# Patient Record
Sex: FEMALE | Race: WHITE | NOT HISPANIC OR LATINO | URBAN - METROPOLITAN AREA
[De-identification: names, ages, dates, MRNs, and addresses within clinical notes are randomized per-mention and may not be internally consistent; named-entity substitution may affect disease eponyms.]

---

## 2017-09-18 ENCOUNTER — APPOINTMENT (OUTPATIENT)
Dept: WOMENS IMAGING | Facility: HOSPITAL | Age: 77
End: 2017-09-18

## 2017-09-18 PROCEDURE — G0202 SCR MAMMO BI INCL CAD: HCPCS | Performed by: RADIOLOGY

## 2018-09-25 ENCOUNTER — APPOINTMENT (OUTPATIENT)
Dept: WOMENS IMAGING | Facility: HOSPITAL | Age: 78
End: 2018-09-25

## 2018-09-25 PROCEDURE — 77067 SCR MAMMO BI INCL CAD: CPT | Performed by: RADIOLOGY

## 2019-10-08 ENCOUNTER — APPOINTMENT (OUTPATIENT)
Dept: WOMENS IMAGING | Facility: HOSPITAL | Age: 79
End: 2019-10-08

## 2019-10-08 PROCEDURE — 77067 SCR MAMMO BI INCL CAD: CPT | Performed by: RADIOLOGY

## 2020-11-24 ENCOUNTER — APPOINTMENT (OUTPATIENT)
Dept: WOMENS IMAGING | Facility: HOSPITAL | Age: 80
End: 2020-11-24

## 2020-11-24 PROCEDURE — 77067 SCR MAMMO BI INCL CAD: CPT | Performed by: RADIOLOGY

## 2025-06-03 ENCOUNTER — APPOINTMENT (OUTPATIENT)
Facility: HOSPITAL | Age: 85
End: 2025-06-03
Payer: MEDICARE

## 2025-06-03 ENCOUNTER — HOSPITAL ENCOUNTER (EMERGENCY)
Facility: HOSPITAL | Age: 85
Discharge: HOME | End: 2025-06-03
Attending: STUDENT IN AN ORGANIZED HEALTH CARE EDUCATION/TRAINING PROGRAM | Admitting: STUDENT IN AN ORGANIZED HEALTH CARE EDUCATION/TRAINING PROGRAM
Payer: MEDICARE

## 2025-06-03 VITALS
HEART RATE: 66 BPM | SYSTOLIC BLOOD PRESSURE: 168 MMHG | DIASTOLIC BLOOD PRESSURE: 67 MMHG | HEIGHT: 63 IN | WEIGHT: 130 LBS | RESPIRATION RATE: 16 BRPM | BODY MASS INDEX: 23.04 KG/M2 | OXYGEN SATURATION: 97 % | TEMPERATURE: 98.2 F

## 2025-06-03 DIAGNOSIS — M79.89 LEFT LEG SWELLING: ICD-10-CM
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
## 2025-06-03 DIAGNOSIS — S80.211A ABRASION OF RIGHT KNEE, INITIAL ENCOUNTER: ICD-10-CM

## 2025-06-03 DIAGNOSIS — S60.222A CONTUSION OF DORSUM OF LEFT HAND: Primary | ICD-10-CM

## 2025-06-03 LAB
ALBUMIN SERPL-MCNC: 3.7 G/DL (ref 3.5–5.2)
ALBUMIN/GLOB SERPL: 1.4 G/DL
ALP SERPL-CCNC: 90 U/L (ref 39–117)
ALT SERPL W P-5'-P-CCNC: 7 U/L (ref 1–33)
ANION GAP SERPL CALCULATED.3IONS-SCNC: 15.1 MMOL/L (ref 5–15)
AST SERPL-CCNC: 18 U/L (ref 1–32)
BASOPHILS # BLD AUTO: 0.05 10*3/MM3 (ref 0–0.2)
BASOPHILS NFR BLD AUTO: 0.6 % (ref 0–1.5)
BILIRUB SERPL-MCNC: 0.3 MG/DL (ref 0–1.2)
BUN SERPL-MCNC: 15.2 MG/DL (ref 8–23)
BUN/CREAT SERPL: 20.5 (ref 7–25)
CALCIUM SPEC-SCNC: 9.1 MG/DL (ref 8.6–10.5)
CHLORIDE SERPL-SCNC: 92 MMOL/L (ref 98–107)
CO2 SERPL-SCNC: 20.9 MMOL/L (ref 22–29)
CREAT SERPL-MCNC: 0.74 MG/DL (ref 0.57–1)
DEPRECATED RDW RBC AUTO: 42.6 FL (ref 37–54)
EGFRCR SERPLBLD CKD-EPI 2021: 80.4 ML/MIN/1.73
EOSINOPHIL # BLD AUTO: 0.06 10*3/MM3 (ref 0–0.4)
EOSINOPHIL NFR BLD AUTO: 0.8 % (ref 0.3–6.2)
ERYTHROCYTE [DISTWIDTH] IN BLOOD BY AUTOMATED COUNT: 14 % (ref 12.3–15.4)
GLOBULIN UR ELPH-MCNC: 2.7 GM/DL
GLUCOSE SERPL-MCNC: 111 MG/DL (ref 65–99)
HCT VFR BLD AUTO: 33.6 % (ref 34–46.6)
HGB BLD-MCNC: 11.4 G/DL (ref 12–15.9)
HOLD SPECIMEN: NORMAL
IMM GRANULOCYTES # BLD AUTO: 0.01 10*3/MM3 (ref 0–0.05)
IMM GRANULOCYTES NFR BLD AUTO: 0.1 % (ref 0–0.5)
LYMPHOCYTES # BLD AUTO: 1.1 10*3/MM3 (ref 0.7–3.1)
LYMPHOCYTES NFR BLD AUTO: 14.1 % (ref 19.6–45.3)
MCH RBC QN AUTO: 27.7 PG (ref 26.6–33)
MCHC RBC AUTO-ENTMCNC: 33.9 G/DL (ref 31.5–35.7)
MCV RBC AUTO: 81.6 FL (ref 79–97)
MONOCYTES # BLD AUTO: 0.86 10*3/MM3 (ref 0.1–0.9)
MONOCYTES NFR BLD AUTO: 11 % (ref 5–12)
NEUTROPHILS NFR BLD AUTO: 5.73 10*3/MM3 (ref 1.7–7)
NEUTROPHILS NFR BLD AUTO: 73.4 % (ref 42.7–76)
NT-PROBNP SERPL-MCNC: 447 PG/ML (ref 0–1800)
PLATELET # BLD AUTO: 174 10*3/MM3 (ref 140–450)
PMV BLD AUTO: 10.4 FL (ref 6–12)
POTASSIUM SERPL-SCNC: 4.2 MMOL/L (ref 3.5–5.2)
PROT SERPL-MCNC: 6.4 G/DL (ref 6–8.5)
RBC # BLD AUTO: 4.12 10*6/MM3 (ref 3.77–5.28)
SODIUM SERPL-SCNC: 128 MMOL/L (ref 136–145)
WBC NRBC COR # BLD AUTO: 7.81 10*3/MM3 (ref 3.4–10.8)
WHOLE BLOOD HOLD COAG: NORMAL
WHOLE BLOOD HOLD SPECIMEN: NORMAL

## 2025-06-03 PROCEDURE — 73130 X-RAY EXAM OF HAND: CPT

## 2025-06-03 PROCEDURE — 73560 X-RAY EXAM OF KNEE 1 OR 2: CPT

## 2025-06-03 PROCEDURE — 25810000003 SODIUM CHLORIDE 0.9 % SOLUTION: Performed by: PHYSICIAN ASSISTANT

## 2025-06-03 PROCEDURE — 85025 COMPLETE CBC W/AUTO DIFF WBC: CPT | Performed by: PHYSICIAN ASSISTANT

## 2025-06-03 PROCEDURE — 83880 ASSAY OF NATRIURETIC PEPTIDE: CPT | Performed by: PHYSICIAN ASSISTANT

## 2025-06-03 PROCEDURE — 80053 COMPREHEN METABOLIC PANEL: CPT | Performed by: PHYSICIAN ASSISTANT

## 2025-06-03 PROCEDURE — 99283 EMERGENCY DEPT VISIT LOW MDM: CPT | Performed by: STUDENT IN AN ORGANIZED HEALTH CARE EDUCATION/TRAINING PROGRAM

## 2025-06-03 RX ORDER — BACITRACIN ZINC, NEOMYCIN SULFATE AND POLYMYXIN B SULFATE 400; 5; 5000 [IU]/G; MG/G; [IU]/G
1 OINTMENT TOPICAL ONCE
Status: COMPLETED | OUTPATIENT
Start: 2025-06-03 | End: 2025-06-03

## 2025-06-03 RX ORDER — ACETAMINOPHEN 500 MG
500 TABLET ORAL ONCE
Status: COMPLETED | OUTPATIENT
Start: 2025-06-03 | End: 2025-06-03

## 2025-06-03 RX ADMIN — BACITRACIN ZINC, NEOMYCIN, POLYMYXIN B 1 APPLICATION: 400; 3.5; 5 OINTMENT TOPICAL at 20:05

## 2025-06-03 RX ADMIN — SODIUM CHLORIDE 500 ML: 9 INJECTION, SOLUTION INTRAVENOUS at 19:11

## 2025-06-03 RX ADMIN — ACETAMINOPHEN 500 MG: 500 TABLET, FILM COATED ORAL at 17:50

## 2025-06-03 NOTE — DISCHARGE INSTRUCTIONS
Given orders for an outpatient Doppler ultrasound tomorrow.  You will be called for an appointment time.  Return to the emergency department for any worsening symptoms.

## 2025-06-03 NOTE — FSED PROVIDER NOTE
"Subjective  History of Present Illness:    This is an 84 year old female who presents with complaints of left hand and left knee pain after a fall.  Patient states that she was getting her mail, slipped on loose gravel and fell against a guardrail.  She did not hit her head or lose consciousness but did lost her lip.  She is not anticoagulated.  She has also noticed worsening left leg swelling for the past 2 weeks.  She states that typically when she has swelling she is able to elevate and rest and the swelling improves however it has not over the past couple of weeks.  She is under the care of Dr. Stapleton for sinus tachycardia.  She recently had a cardiac workup with ultrasound, echo and stress testing.  She did have Doppler ultrasound of left leg in April due to swelling.       Nurses Notes reviewed and agree, including vitals, allergies, social history and prior medical history.     REVIEW OF SYSTEMS: All systems reviewed and not pertinent unless noted.  Review of Systems    No past medical history on file.    Allergies:    Patient has no known allergies.      No past surgical history on file.      Social History     Socioeconomic History    Marital status: Unknown         No family history on file.    Objective  Physical Exam:  /67   Pulse 66   Temp 98.2 °F (36.8 °C) (Oral)   Resp 16   Ht 160 cm (63\")   Wt 59 kg (130 lb)   SpO2 97%   BMI 23.03 kg/m²      Physical Exam  Vitals and nursing note reviewed.   HENT:      Mouth/Throat:      Mouth: Mucous membranes are moist.   Eyes:      Pupils: Pupils are equal, round, and reactive to light.   Cardiovascular:      Rate and Rhythm: Normal rate and regular rhythm.      Pulses: Normal pulses.      Heart sounds: Normal heart sounds.   Pulmonary:      Effort: Pulmonary effort is normal.      Breath sounds: Normal breath sounds.   Skin:     General: Skin is warm.      Comments: Abrasions noted to the left knee. Small amount of oozing.    Neurological:      " General: No focal deficit present.         Procedures    ED Course:         Lab Results (last 24 hours)       Procedure Component Value Units Date/Time    CBC & Differential [[id number]]  (Abnormal) Collected: 06/03/25 1739    Specimen: Blood from Arm, Right Updated: 06/03/25 1750    Narrative:      The following orders were created for panel order CBC & Differential.  Procedure                               Abnormality         Status                     ---------                               -----------         ------                     CBC Auto Differential[[id number]]        Abnormal            Final result                 Please view results for these tests on the individual orders.    Comprehensive Metabolic Panel [[id number]]  (Abnormal) Collected: 06/03/25 1739    Specimen: Blood from Arm, Right Updated: 06/03/25 1807     Glucose 111 mg/dL      BUN 15.2 mg/dL      Creatinine 0.74 mg/dL      Sodium 128 mmol/L      Potassium 4.2 mmol/L      Chloride 92 mmol/L      CO2 20.9 mmol/L      Calcium 9.1 mg/dL      Total Protein 6.4 g/dL      Albumin 3.7 g/dL      ALT (SGPT) 7 U/L      AST (SGOT) 18 U/L      Alkaline Phosphatase 90 U/L      Total Bilirubin 0.3 mg/dL      Globulin 2.7 gm/dL      A/G Ratio 1.4 g/dL      BUN/Creatinine Ratio 20.5     Anion Gap 15.1 mmol/L      eGFR 80.4 mL/min/1.73     Narrative:      GFR Categories in Chronic Kidney Disease (CKD)              GFR Category          GFR (mL/min/1.73)    Interpretation  G1                    90 or greater        Normal or high (1)  G2                    60-89                Mild decrease (1)  G3a                   45-59                Mild to moderate decrease  G3b                   30-44                Moderate to severe decrease  G4                    15-29                Severe decrease  G5                    14 or less           Kidney failure    (1)In the absence of evidence of kidney disease, neither GFR category G1 or G2 fulfill the criteria for  CKD.    eGFR calculation 2021 CKD-EPI creatinine equation, which does not include race as a factor    BNP [[id number]]  (Normal) Collected: 06/03/25 1739    Specimen: Blood from Arm, Right Updated: 06/03/25 1804     proBNP 447.0 pg/mL     Narrative:      This assay is used as an aid in the diagnosis of individuals suspected of having heart failure. It can be used as an aid in the diagnosis of acute decompensated heart failure (ADHF) in patients presenting with signs and symptoms of ADHF to the emergency department (ED). In addition, NT-proBNP of <300 pg/mL indicates ADHF is not likely.    Age Range Result Interpretation  NT-proBNP Concentration (pg/mL:      <50             Positive            >450                   Gray                 300-450                    Negative             <300    50-75           Positive            >900                  Gray                300-900                  Negative            <300      >75             Positive            >1800                  Gray                300-1800                  Negative            <300    CBC Auto Differential [[id number]]  (Abnormal) Collected: 06/03/25 1739    Specimen: Blood from Arm, Right Updated: 06/03/25 1750     WBC 7.81 10*3/mm3      RBC 4.12 10*6/mm3      Hemoglobin 11.4 g/dL      Hematocrit 33.6 %      MCV 81.6 fL      MCH 27.7 pg      MCHC 33.9 g/dL      RDW 14.0 %      RDW-SD 42.6 fl      MPV 10.4 fL      Platelets 174 10*3/mm3      Neutrophil % 73.4 %      Lymphocyte % 14.1 %      Monocyte % 11.0 %      Eosinophil % 0.8 %      Basophil % 0.6 %      Immature Grans % 0.1 %      Neutrophils, Absolute 5.73 10*3/mm3      Lymphocytes, Absolute 1.10 10*3/mm3      Monocytes, Absolute 0.86 10*3/mm3      Eosinophils, Absolute 0.06 10*3/mm3      Basophils, Absolute 0.05 10*3/mm3      Immature Grans, Absolute 0.01 10*3/mm3              XR Hand 3+ View Left  Result Date: 6/3/2025  XR KNEE 1 OR 2 VW LEFT, XR HAND 3+ VW LEFT Date of Exam: 6/3/2025 5:36 PM  EDT Indication: fall right knee pain Comparison: None available. Findings: There is prepatellar soft tissue swelling and fat stranding. No soft tissue air or radiopaque foreign body. No knee joint effusion. No acute fracture or traumatic malalignment.     Impression: Impression: Anterior knee/prepatellar soft tissue contusion. No acute fracture or traumatic malalignment. Electronically Signed: John Gonzales MD  6/3/2025 6:18 PM EDT  Workstation ID: [id number]    XR Knee 1 or 2 View Left  Result Date: 6/3/2025  XR KNEE 1 OR 2 VW LEFT, XR HAND 3+ VW LEFT Date of Exam: 6/3/2025 5:36 PM EDT Indication: fall right knee pain Comparison: None available. Findings: There is prepatellar soft tissue swelling and fat stranding. No soft tissue air or radiopaque foreign body. No knee joint effusion. No acute fracture or traumatic malalignment.     Impression: Impression: Anterior knee/prepatellar soft tissue contusion. No acute fracture or traumatic malalignment. Electronically Signed: John Gonzales MD  6/3/2025 6:18 PM EDT  Workstation ID: [id number]         [facility]     Amount and/or Complexity of Data Reviewed  Clinical lab tests: reviewed  Tests in the radiology section of CPT®: reviewed        Initial impression of presenting illness: This is a 89-year-old female who presents with complaints of left knee and left hand pain after a fall.  Patient also has noted left leg swelling.  X-rays did not reveal any acute evidence of fractures.  I am unable to get a Doppler ultrasound of her left leg today however I did place an order for tomorrow.  I have low suspicion for a DVT as he does have a history of peripheral edema.  Her neighbor who is one of her caretakers did contact Dr. Stapleton's office while patient was here and he recommended compression stockings which I encouraged patient to use.  She states that she has some at home.  She was mildly hyponatremic at 128.  Patient is unsure if she has been hyponatremic in the past.  She has  no dizziness, no confusion.  I did give her a 500 mL bolus of normal saline and recommended she follow-up with her primary care provider in 1 week for recheck of her BMP.  Discussed all findings and plan of care with the patient who is agreeable to discharge.    DDX: includes but is not limited to: Abrasion, fracture, contusion, DVT        Medications   acetaminophen (TYLENOL) tablet 500 mg (500 mg Oral Given 6/3/25 1750)   sodium chloride 0.9 % bolus 500 mL (0 mL Intravenous Stopped 6/3/25 2005)   Triple Antibiotic 5-400-5000 MG-UNIT ointment 1 Application (1 Application Topical Given 6/3/25 2005)         Data interpreted: Nursing notes reviewed, vital signs reviewed.  Labs independently interpreted by me (CBC, CMP, lipase, UA, troponin, ABG, lactic acid, procalcitonin).  Imaging independently interpreted by me (x-ray, CT scan).  EKG independently interpreted by me.  O2 saturation:    Counseling: Discussed the results above with the patient regarding need for admission or discharge.  Patient understands and agrees plan of care.      -----  ED Disposition       ED Disposition   Discharge    Condition   Stable    Comment   --             Final diagnoses:   Contusion of dorsum of left hand   Abrasion of right knee, initial encounter   Fall from standing, initial encounter   Left leg swelling      Your Follow-Up Providers       Go to  [facility] EMERGENCY DEPARTMENT [place].    Specialty: Emergency Medicine  Follow up details: As needed, If symptoms worsen  [address]  [facility] [address] [phone]             Maikol Brambila MD.    Specialty: Family Medicine  Follow up details: As needed, If symptoms worsen  [address]  [facility] [address]  [phone]                       Contact information for after-discharge care    Follow-up information has not been specified.                    Your medication list      as of Nadia 3, 2025  7:56 PM     You  have not been prescribed any medications.

## 2025-06-07 ENCOUNTER — HOSPITAL ENCOUNTER (EMERGENCY)
Facility: HOSPITAL | Age: 85
Discharge: HOME | End: 2025-06-07
Attending: EMERGENCY MEDICINE
Payer: MEDICARE

## 2025-06-07 ENCOUNTER — APPOINTMENT (OUTPATIENT)
Facility: HOSPITAL | Age: 85
End: 2025-06-07
Payer: MEDICARE

## 2025-06-07 VITALS
OXYGEN SATURATION: 99 % | HEART RATE: 59 BPM | SYSTOLIC BLOOD PRESSURE: 167 MMHG | RESPIRATION RATE: 20 BRPM | HEIGHT: 61 IN | BODY MASS INDEX: 25.69 KG/M2 | TEMPERATURE: 98.5 F | WEIGHT: 136.1 LBS | DIASTOLIC BLOOD PRESSURE: 61 MMHG

## 2025-06-07 DIAGNOSIS — M25.562 ACUTE PAIN OF LEFT KNEE: Primary | ICD-10-CM

## 2025-06-07 DIAGNOSIS — R60.0 EDEMA OF LEFT LOWER EXTREMITY: ICD-10-CM

## 2025-06-07 DIAGNOSIS — S80.212A ABRASION OF LEFT KNEE, INITIAL ENCOUNTER: ICD-10-CM

## 2025-06-07 LAB
ANION GAP SERPL CALCULATED.3IONS-SCNC: 9.8 MMOL/L (ref 5–15)
BASOPHILS # BLD AUTO: 0.04 10*3/MM3 (ref 0–0.2)
BASOPHILS NFR BLD AUTO: 0.8 % (ref 0–1.5)
BUN SERPL-MCNC: 15.1 MG/DL (ref 8–23)
BUN/CREAT SERPL: 20.4 (ref 7–25)
CALCIUM SPEC-SCNC: 9.2 MG/DL (ref 8.6–10.5)
CHLORIDE SERPL-SCNC: 94 MMOL/L (ref 98–107)
CO2 SERPL-SCNC: 23.2 MMOL/L (ref 22–29)
CREAT SERPL-MCNC: 0.74 MG/DL (ref 0.57–1)
DEPRECATED RDW RBC AUTO: 43.4 FL (ref 37–54)
EGFRCR SERPLBLD CKD-EPI 2021: 80.4 ML/MIN/1.73
EOSINOPHIL # BLD AUTO: 0.09 10*3/MM3 (ref 0–0.4)
EOSINOPHIL NFR BLD AUTO: 1.8 % (ref 0.3–6.2)
ERYTHROCYTE [DISTWIDTH] IN BLOOD BY AUTOMATED COUNT: 14 % (ref 12.3–15.4)
GLUCOSE SERPL-MCNC: 107 MG/DL (ref 65–99)
HCT VFR BLD AUTO: 33.6 % (ref 34–46.6)
HGB BLD-MCNC: 11.2 G/DL (ref 12–15.9)
IMM GRANULOCYTES # BLD AUTO: 0.01 10*3/MM3 (ref 0–0.05)
IMM GRANULOCYTES NFR BLD AUTO: 0.2 % (ref 0–0.5)
INR PPP: 0.92 (ref 0.89–1.12)
LYMPHOCYTES # BLD AUTO: 1.49 10*3/MM3 (ref 0.7–3.1)
LYMPHOCYTES NFR BLD AUTO: 29.4 % (ref 19.6–45.3)
MCH RBC QN AUTO: 28 PG (ref 26.6–33)
MCHC RBC AUTO-ENTMCNC: 33.3 G/DL (ref 31.5–35.7)
MCV RBC AUTO: 84 FL (ref 79–97)
MONOCYTES # BLD AUTO: 0.53 10*3/MM3 (ref 0.1–0.9)
MONOCYTES NFR BLD AUTO: 10.5 % (ref 5–12)
NEUTROPHILS NFR BLD AUTO: 2.9 10*3/MM3 (ref 1.7–7)
NEUTROPHILS NFR BLD AUTO: 57.3 % (ref 42.7–76)
NT-PROBNP SERPL-MCNC: 457 PG/ML (ref 0–1800)
PLATELET # BLD AUTO: 178 10*3/MM3 (ref 140–450)
PMV BLD AUTO: 10 FL (ref 6–12)
POTASSIUM SERPL-SCNC: 4.2 MMOL/L (ref 3.5–5.2)
PROTHROMBIN TIME: 12.5 SECONDS (ref 12.2–14.5)
RBC # BLD AUTO: 4 10*6/MM3 (ref 3.77–5.28)
SODIUM SERPL-SCNC: 127 MMOL/L (ref 136–145)
WBC NRBC COR # BLD AUTO: 5.06 10*3/MM3 (ref 3.4–10.8)

## 2025-06-07 PROCEDURE — 85025 COMPLETE CBC W/AUTO DIFF WBC: CPT | Performed by: PHYSICIAN ASSISTANT

## 2025-06-07 PROCEDURE — 90715 TDAP VACCINE 7 YRS/> IM: CPT | Performed by: PHYSICIAN ASSISTANT

## 2025-06-07 PROCEDURE — 90471 IMMUNIZATION ADMIN: CPT | Performed by: PHYSICIAN ASSISTANT

## 2025-06-07 PROCEDURE — 83880 ASSAY OF NATRIURETIC PEPTIDE: CPT | Performed by: PHYSICIAN ASSISTANT

## 2025-06-07 PROCEDURE — 36415 COLL VENOUS BLD VENIPUNCTURE: CPT

## 2025-06-07 PROCEDURE — 25010000002 TETANUS-DIPHTH-ACELL PERTUSSIS 5-2.5-18.5 LF-MCG/0.5 SUSPENSION PREFILLED SYRINGE: Performed by: PHYSICIAN ASSISTANT

## 2025-06-07 PROCEDURE — 85610 PROTHROMBIN TIME: CPT | Performed by: PHYSICIAN ASSISTANT

## 2025-06-07 PROCEDURE — 80048 BASIC METABOLIC PNL TOTAL CA: CPT | Performed by: PHYSICIAN ASSISTANT

## 2025-06-07 PROCEDURE — 73590 X-RAY EXAM OF LOWER LEG: CPT

## 2025-06-07 PROCEDURE — 99283 EMERGENCY DEPT VISIT LOW MDM: CPT | Performed by: EMERGENCY MEDICINE

## 2025-06-07 PROCEDURE — 73560 X-RAY EXAM OF KNEE 1 OR 2: CPT

## 2025-06-07 RX ORDER — ASPIRIN 81 MG/1
81 TABLET, CHEWABLE ORAL DAILY
COMMUNITY

## 2025-06-07 RX ORDER — METOPROLOL SUCCINATE 25 MG/1
25 TABLET, EXTENDED RELEASE ORAL DAILY
COMMUNITY

## 2025-06-07 RX ORDER — OMEPRAZOLE 20 MG/1
20 CAPSULE, DELAYED RELEASE ORAL DAILY
COMMUNITY

## 2025-06-07 RX ORDER — HYDROCHLOROTHIAZIDE 25 MG/1
25 TABLET ORAL DAILY
COMMUNITY

## 2025-06-07 RX ADMIN — TETANUS TOXOID, REDUCED DIPHTHERIA TOXOID AND ACELLULAR PERTUSSIS VACCINE, ADSORBED 0.5 ML: 5; 2.5; 8; 8; 2.5 SUSPENSION INTRAMUSCULAR at 17:35

## 2025-06-07 RX ADMIN — SACUBITRIL AND VALSARTAN 1 TABLET: 49; 51 TABLET, FILM COATED ORAL at 17:36

## 2025-06-07 NOTE — DISCHARGE INSTRUCTIONS
Follow-up with PCP for recheck of symptoms.  You should be contacted to schedule appointment for left lower extremity ultrasound tomorrow morning.  If you do not get contact to set up an appointment please call [facility] to schedule appointment. [phone].  Have low threshold to return the emergency department for new, worsening, concerning symptoms

## 2025-06-07 NOTE — FSED PROVIDER NOTE
"Subjective  History of Present Illness:    Patient is an 84-year-old female presents emergency department for evaluation of left knee pain and left lower extremity edema.  Patient was evaluated in the emergency department on Nadia 3 after she had tripped and fallen landing on that left knee causing abrasion and left knee pain.  Patient had noted left lower extremity edema at that time but was unable to get ultrasound of the emergency department due to lack of access for vascular ultrasound and is scheduled to have 1 on the 12th of this month.  Patient has continued edema and pain prompting her return to emergency department for evaluation.  Patient denies new trauma.  Patient denies lightheadedness, chest pain, dyspnea, fever or systemic symptoms.  Patient is able to ambulate without assistance.      Nurses Notes reviewed and agree, including vitals, allergies, social history and prior medical history.     REVIEW OF SYSTEMS: All systems reviewed and not pertinent unless noted.  Review of Systems   Musculoskeletal:         Left lower extremity edema and abrasion of anterior aspect left knee   All other systems reviewed and are negative.      No past medical history on file.    Allergies:    Patient has no known allergies.      No past surgical history on file.      Social History     Socioeconomic History    Marital status: Single         No family history on file.    Objective  Physical Exam:  /61   Pulse 59   Temp 98.5 °F (36.9 °C) (Oral)   Resp 20   Ht 154.9 cm (61\")   Wt 61.7 kg (136 lb 1.6 oz)   SpO2 99%   BMI 25.72 kg/m²      Physical Exam  Vitals and nursing note reviewed.   Constitutional:       General: She is not in acute distress.     Appearance: Normal appearance. She is not toxic-appearing.   HENT:      Head: Normocephalic and atraumatic.      Nose: Nose normal.      Mouth/Throat:      Mouth: Mucous membranes are moist.   Eyes:      Extraocular Movements: Extraocular movements intact.      " Conjunctiva/sclera: Conjunctivae normal.      Pupils: Pupils are equal, round, and reactive to light.   Cardiovascular:      Rate and Rhythm: Normal rate and regular rhythm.      Pulses: Normal pulses.      Heart sounds: Normal heart sounds. No murmur heard.  Pulmonary:      Effort: Pulmonary effort is normal. No respiratory distress.      Breath sounds: Normal breath sounds. No stridor. No wheezing.   Abdominal:      General: Abdomen is flat. There is no distension.      Palpations: Abdomen is soft.   Musculoskeletal:         General: No deformity. Normal range of motion.      Cervical back: Normal range of motion and neck supple.      Right lower leg: No edema.      Left lower leg: Edema present.      Comments: Abrasion over anterior aspect left knee. Left lower extremity edema larger in size compared to right.  No increased warmth to left lower extremity pain neuro vas intact left lower extremity.  Ecchymosis noted to anterior aspect distal left lower extremity.  Full range of motion of left knee.   Skin:     General: Skin is warm and dry.      Capillary Refill: Capillary refill takes less than 2 seconds.      Findings: No rash.   Neurological:      General: No focal deficit present.      Mental Status: She is alert and oriented to person, place, and time.      Cranial Nerves: No cranial nerve deficit.      Sensory: No sensory deficit.      Gait: Gait normal.   Psychiatric:         Mood and Affect: Mood normal.         Behavior: Behavior normal.         Procedures    ED Course:         Lab Results (last 24 hours)       Procedure Component Value Units Date/Time    CBC & Differential [[id number]]  (Abnormal) Collected: 06/07/25 1736    Specimen: Blood Updated: 06/07/25 1746    Narrative:      The following orders were created for panel order CBC & Differential.  Procedure                               Abnormality         Status                     ---------                               -----------         ------                      CBC Auto Differential[[id number]]        Abnormal            Final result                 Please view results for these tests on the individual orders.    Basic Metabolic Panel [[id number]]  (Abnormal) Collected: 06/07/25 1736    Specimen: Blood Updated: 06/07/25 1802     Glucose 107 mg/dL      BUN 15.1 mg/dL      Creatinine 0.74 mg/dL      Sodium 127 mmol/L      Potassium 4.2 mmol/L      Chloride 94 mmol/L      CO2 23.2 mmol/L      Calcium 9.2 mg/dL      BUN/Creatinine Ratio 20.4     Anion Gap 9.8 mmol/L      eGFR 80.4 mL/min/1.73     Narrative:      GFR Categories in Chronic Kidney Disease (CKD)              GFR Category          GFR (mL/min/1.73)    Interpretation  G1                    90 or greater        Normal or high (1)  G2                    60-89                Mild decrease (1)  G3a                   45-59                Mild to moderate decrease  G3b                   30-44                Moderate to severe decrease  G4                    15-29                Severe decrease  G5                    14 or less           Kidney failure    (1)In the absence of evidence of kidney disease, neither GFR category G1 or G2 fulfill the criteria for CKD.    eGFR calculation 2021 CKD-EPI creatinine equation, which does not include race as a factor    Protime-INR [[id number]]  (Normal) Collected: 06/07/25 1736    Specimen: Blood Updated: 06/07/25 1756     Protime 12.5 Seconds      INR 0.92    CBC Auto Differential [[id number]]  (Abnormal) Collected: 06/07/25 1736    Specimen: Blood Updated: 06/07/25 1746     WBC 5.06 10*3/mm3      RBC 4.00 10*6/mm3      Hemoglobin 11.2 g/dL      Hematocrit 33.6 %      MCV 84.0 fL      MCH 28.0 pg      MCHC 33.3 g/dL      RDW 14.0 %      RDW-SD 43.4 fl      MPV 10.0 fL      Platelets 178 10*3/mm3      Neutrophil % 57.3 %      Lymphocyte % 29.4 %      Monocyte % 10.5 %      Eosinophil % 1.8 %      Basophil % 0.8 %      Immature Grans % 0.2 %      Neutrophils, Absolute 2.90  10*3/mm3      Lymphocytes, Absolute 1.49 10*3/mm3      Monocytes, Absolute 0.53 10*3/mm3      Eosinophils, Absolute 0.09 10*3/mm3      Basophils, Absolute 0.04 10*3/mm3      Immature Grans, Absolute 0.01 10*3/mm3     BNP [[id number]]  (Normal) Collected: 06/07/25 1736    Specimen: Blood Updated: 06/07/25 1818     proBNP 457.0 pg/mL     Narrative:      This assay is used as an aid in the diagnosis of individuals suspected of having heart failure. It can be used as an aid in the diagnosis of acute decompensated heart failure (ADHF) in patients presenting with signs and symptoms of ADHF to the emergency department (ED). In addition, NT-proBNP of <300 pg/mL indicates ADHF is not likely.    Age Range Result Interpretation  NT-proBNP Concentration (pg/mL:      <50             Positive            >450                   Gray                 300-450                    Negative             <300    50-75           Positive            >900                  Gray                300-900                  Negative            <300      >75             Positive            >1800                  Gray                300-1800                  Negative            <300             XR Knee 1 or 2 View Left  Result Date: 6/7/2025  XR TIBIA FIBULA 2 VW LEFT, XR KNEE 1 OR 2 VW LEFT Date of Exam: 6/7/2025 5:51 PM EDT Indication: LE pain edema, fall Comparison: None available. Findings: Diffuse lower extremity edema. No visualized displaced fracture or joint malalignment of the left knee. Minimal degenerative change. No large joint effusion. No visualized fracture of the tib-fib. Ankle joint alignment appears maintained.     Impression: Impression: No acute osseous abnormality of the left knee or tib-fib. Diffuse lower extremity edema, correlate for volume overload/third spacing. Electronically Signed: Reese Tang MD  6/7/2025 5:56 PM EDT  Workstation ID: [id number]    XR Tibia Fibula 2 View Left  Result Date: 6/7/2025  XR TIBIA FIBULA 2 VW  LEFT, XR KNEE 1 OR 2 VW LEFT Date of Exam: 6/7/2025 5:51 PM EDT Indication: LE pain edema, fall Comparison: None available. Findings: Diffuse lower extremity edema. No visualized displaced fracture or joint malalignment of the left knee. Minimal degenerative change. No large joint effusion. No visualized fracture of the tib-fib. Ankle joint alignment appears maintained.     Impression: Impression: No acute osseous abnormality of the left knee or tib-fib. Diffuse lower extremity edema, correlate for volume overload/third spacing. Electronically Signed: Reese Tang MD  6/7/2025 5:56 PM EDT  Workstation ID: [id number]         [facility]     Amount and/or Complexity of Data Reviewed  Clinical lab tests: reviewed  Tests in the radiology section of CPT®: reviewed  Decide to obtain previous medical records or to obtain history from someone other than the patient: yes        Initial impression of presenting illness: Patient is an 84-year-old female presents emergency department for evaluation of left knee pain and left lower extremity pain.  Patient was evaluated the emergency department on Nadia 3 where she had negative x-rays and lab work was scheduled to have ultrasound performed outpatient which she has scheduled for the 12th of this month.    Patient arrives afebrile, hematin stable, nontoxic-appearing with vitals interpreted by myself.     Pertinent features from physical exam: Left lower extremity edema greater than right.  Large healing abrasion over anterior aspect left knee.  Ecchymosis of anterior aspect left lower extremity.  Neurovascular intact left lower extremity.  No chest pain or dyspnea.    DDX: includes but is not limited to: DVT, contusion, hematoma, fracture, sprain, others    Initial diagnostic plan and interventions: Workup includes x-ray left knee and tib-fib, CBC, BMP, BNP, PT.    Diagnostic information from other sources: Recent ED visit.    Results from initial plan were reviewed and interpreted by me  revealing labwork nonactionable in the emergency department.  X-rays reveal no acute findings.    Re-evaluation: Patient is well-appearing hemodynamic stable.  At this time symptoms likely due to contusion, hematoma with lower extremity edema after falling however patient does require further evaluation with venous duplex ultrasound with unilateral lower extremity edema and skin discoloration.  Patient will be given outpatient order for ultrasound for tomorrow.  Patient instructed to contact [facility] if she does not receive call for appointment.  Patient is agreeable with plan.  Patient given strict return precautions to the emergency department.      Medications   sacubitril-valsartan (ENTRESTO) 49-51 MG tablet 1 tablet (1 tablet Oral Given 6/7/25 1736)   Tetanus-Diphth-Acell Pertussis (BOOSTRIX) injection 0.5 mL (0.5 mL Intramuscular Given 6/7/25 1735)       Results/clinical rationale were discussed with patient and family      Data interpreted: Nursing notes reviewed, vital signs reviewed.  Labs independently interpreted by me.  Imaging independently interpreted by me.  EKG independently interpreted by me.     Counseling: Discussed the results above with the patient regarding need for admission or discharge.  Patient understands and agrees plan of care.      -----  ED Disposition       ED Disposition   Discharge    Condition   Stable    Comment   --             Final diagnoses:   Acute pain of left knee   Abrasion of left knee, initial encounter   Edema of left lower extremity      Your Follow-Up Providers       Schedule an appointment as soon as possible for a visit  with Alma Rosa Aj MD.    Specialty: Family Medicine  [address] [id number] [phone]                       Contact information for after-discharge care    Follow-up information has not been specified.                    Your medication list        CONTINUE taking these medications        Instructions  Last Dose Given Next Dose Due   aspirin 81 MG chewable tablet      Chew 1 tablet Daily.       hydroCHLOROthiazide 25 MG tablet      Take 1 tablet by mouth Daily.       metoprolol succinate XL 25 MG 24 hr tablet  Commonly known as: TOPROL-XL      Take 1 tablet by mouth Daily.       omeprazole 20 MG capsule  Commonly known as: priLOSEC      Take 1 capsule by mouth Daily.       sacubitril-valsartan 49-51 MG tablet  Commonly known as: ENTRESTO      Take 1 tablet by mouth 2 (Two) Times a Day.       Simvastatin 40 MG/5ML suspension      Take 40 mg by mouth Daily.

## 2025-06-08 ENCOUNTER — HOSPITAL ENCOUNTER (OUTPATIENT)
Dept: CARDIOLOGY | Facility: HOSPITAL | Age: 85
Discharge: HOME | End: 2025-06-08
Admitting: PHYSICIAN ASSISTANT
Payer: MEDICARE

## 2025-06-08 ENCOUNTER — RESULTS FOLLOW-UP (OUTPATIENT)
Facility: HOSPITAL | Age: 85
End: 2025-06-08
Payer: MEDICARE

## 2025-06-08 DIAGNOSIS — M25.562 ACUTE PAIN OF LEFT KNEE: ICD-10-CM

## 2025-06-08 DIAGNOSIS — R60.0 EDEMA OF LEFT LOWER EXTREMITY: ICD-10-CM

## 2025-06-08 LAB
BH CV LOWER VASCULAR LEFT COMMON FEMORAL AUGMENT: NORMAL
BH CV LOWER VASCULAR LEFT COMMON FEMORAL COMPRESS: NORMAL
BH CV LOWER VASCULAR LEFT COMMON FEMORAL PHASIC: NORMAL
BH CV LOWER VASCULAR LEFT COMMON FEMORAL SPONT: NORMAL
BH CV LOWER VASCULAR LEFT DISTAL FEMORAL AUGMENT: NORMAL
BH CV LOWER VASCULAR LEFT DISTAL FEMORAL COMPRESS: NORMAL
BH CV LOWER VASCULAR LEFT DISTAL FEMORAL PHASIC: NORMAL
BH CV LOWER VASCULAR LEFT DISTAL FEMORAL SPONT: NORMAL
BH CV LOWER VASCULAR LEFT GASTRONEMIUS COMPRESS: NORMAL
BH CV LOWER VASCULAR LEFT GREATER SAPH AK COMPRESS: NORMAL
BH CV LOWER VASCULAR LEFT GREATER SAPH BK COMPRESS: NORMAL
BH CV LOWER VASCULAR LEFT LESSER SAPH COMPRESS: NORMAL
BH CV LOWER VASCULAR LEFT MID FEMORAL AUGMENT: NORMAL
BH CV LOWER VASCULAR LEFT MID FEMORAL COMPRESS: NORMAL
BH CV LOWER VASCULAR LEFT MID FEMORAL PHASIC: NORMAL
BH CV LOWER VASCULAR LEFT MID FEMORAL SPONT: NORMAL
BH CV LOWER VASCULAR LEFT PERONEAL COMPRESS: NORMAL
BH CV LOWER VASCULAR LEFT POPLITEAL AUGMENT: NORMAL
BH CV LOWER VASCULAR LEFT POPLITEAL COMPRESS: NORMAL
BH CV LOWER VASCULAR LEFT POPLITEAL PHASIC: NORMAL
BH CV LOWER VASCULAR LEFT POPLITEAL SPONT: NORMAL
BH CV LOWER VASCULAR LEFT POSTERIOR TIBIAL COMPRESS: NORMAL
BH CV LOWER VASCULAR LEFT PROFUNDA FEMORAL AUGMENT: NORMAL
BH CV LOWER VASCULAR LEFT PROFUNDA FEMORAL PHASIC: NORMAL
BH CV LOWER VASCULAR LEFT PROFUNDA FEMORAL SPONT: NORMAL
BH CV LOWER VASCULAR LEFT PROXIMAL FEMORAL AUGMENT: NORMAL
BH CV LOWER VASCULAR LEFT PROXIMAL FEMORAL COMPRESS: NORMAL
BH CV LOWER VASCULAR LEFT PROXIMAL FEMORAL PHASIC: NORMAL
BH CV LOWER VASCULAR LEFT PROXIMAL FEMORAL SPONT: NORMAL
BH CV LOWER VASCULAR LEFT SAPHENOFEMORAL JUNCTION AUGMENT: NORMAL
BH CV LOWER VASCULAR LEFT SAPHENOFEMORAL JUNCTION COMPRESS: NORMAL
BH CV LOWER VASCULAR LEFT SAPHENOFEMORAL JUNCTION PHASIC: NORMAL
BH CV LOWER VASCULAR LEFT SAPHENOFEMORAL JUNCTION SPONT: NORMAL
BH CV LOWER VASCULAR RIGHT COMMON FEMORAL AUGMENT: NORMAL
BH CV LOWER VASCULAR RIGHT COMMON FEMORAL PHASIC: NORMAL
BH CV LOWER VASCULAR RIGHT COMMON FEMORAL SPONT: NORMAL

## 2025-06-08 PROCEDURE — 93971 EXTREMITY STUDY: CPT

## 2025-06-08 PROCEDURE — 93971 EXTREMITY STUDY: CPT | Performed by: INTERNAL MEDICINE
